# Patient Record
(demographics unavailable — no encounter records)

---

## 2025-05-28 NOTE — HISTORY OF PRESENT ILLNESS
[FreeTextEntry1] : The patient is a pleasant otherwise healthy 22-year-old female non-smoker who presents today to discuss breast reduction surgery.  She has a history of neck, shoulder and back pain with indentations in the shoulders from wearing her bra.  She has not done physical therapy.  She attributes her symptoms to her large pendulous breast.  Of note she recently gave birth and has a 2-month-old daughter.  She is not breast-feeding.  The breast grew with her pregnancy.  She does not know her current cup size and previously was a D cup.  She has a family history of breast cancer in her maternal grandmother and maternal aunt at or above the age of 40.  She has had no breast imaging

## 2025-05-28 NOTE — PHYSICAL EXAM
[TextEntry] : Physical Exam General: No acute distress, well-appearing Neuro: Alert and oriented x3, no focal deficits noted Psych: Appropriate mood and affect HEENT: Normocephalic, atraumatic Respiratory: Breathing comfortably on room air, normal effort and expansion Cardio: Regular rate by radial pulse, no cyanosis Extremities: Warm well-perfused, moving all     Breast: Bilateral breasts with good volume and NAC symmetry, grade II bordering III ptosis    Measurements (cm)        R         L SNN                               29       29 BW                                 14       14 NIMF                              14       14  All breast exams are performed with a female chaperone present

## 2025-05-28 NOTE — PLAN
[TextEntry] : Patient presents today with primary complaint of breast hypertrophy seeking breast reduction surgery.  Although I do think she is a good candidate for breast duction, would not perform the surgery at this time.  Even though she is not breast-feeding the breasts are enlarged from pregnancy hormones and I would allow time for the breast to return to some baseline size.  Also she will have strict lifting restrictions after breast reduction which would preclude her from holding her baby.  Will have her return to clinic in 6 months for repeat assessment